# Patient Record
(demographics unavailable — no encounter records)

---

## 2024-11-06 NOTE — HISTORY OF PRESENT ILLNESS
[de-identified] : Patient presents today for c/o hearing loss and congestion.  She states she feels as if she cannot breathe through her nose, and she is getting pain and pressure in her forehead. Her left nostril is causing shooting pain. She gets pain in her teeth as well. She also would like her hearing to be checked. Symptoms have been ongoing for years.  No further complaints.

## 2024-11-06 NOTE — ASSESSMENT
[FreeTextEntry1] : I reviewed, interpreted, and discussed the Audiogram done today. Bilateral SNHL.  Tristan maneuver : +++  Recommended breathe right strips.  Part of history and discussion with patient's daughter.

## 2024-11-06 NOTE — PROCEDURE
[Anterior rhinoscopy insufficient to account for symptoms] : anterior rhinoscopy insufficient to account for symptoms [Flexible Endoscope] : examined with the flexible endoscope [Congested] : congested [Normal] : the middle meatus had no abnormalities [FreeTextEntry2] : snterior deviation